# Patient Record
Sex: MALE | Race: WHITE | URBAN - METROPOLITAN AREA
[De-identification: names, ages, dates, MRNs, and addresses within clinical notes are randomized per-mention and may not be internally consistent; named-entity substitution may affect disease eponyms.]

---

## 2017-01-04 ENCOUNTER — OFFICE VISIT (OUTPATIENT)
Dept: FAMILY MEDICINE | Facility: CLINIC | Age: 32
End: 2017-01-04

## 2017-01-04 VITALS
WEIGHT: 149.4 LBS | OXYGEN SATURATION: 96 % | SYSTOLIC BLOOD PRESSURE: 134 MMHG | TEMPERATURE: 98.7 F | BODY MASS INDEX: 22.23 KG/M2 | DIASTOLIC BLOOD PRESSURE: 81 MMHG | HEART RATE: 88 BPM

## 2017-01-04 DIAGNOSIS — M25.571 ACUTE RIGHT ANKLE PAIN: Primary | ICD-10-CM

## 2017-01-04 NOTE — MR AVS SNAPSHOT
After Visit Summary   1/4/2017    Thomas Torres    MRN: [id number]           Patient Information     Date Of Birth          1985        Visit Information        Provider Department      1/4/2017 9:20 AM Jude Amos DO [facility]        Today's Diagnoses     Acute right ankle pain    -  1       Care Instructions    Ankle pain   - No fracture  - Ibuprofen as needed do not take more than 3200 mg in one day  - Elevate and Ice as able    Thank you for coming to [facility].  **If you had lab testing today and your results are reassuring or normal they will be be mailed to you within 7 days.   **If the lab tests need quick action we will call you with the results.  The phone number we will call with results is # [phone] (home) . If this is not the best number please call our clinic and change the number.  If you need any refills please call your pharmacy and they will contact us.  If you have any concerns about today's visit or wish to schedule another appointment please call our office during normal business hours [phone] (8-5:00 M-F)  If you have urgent medical concerns please call [phone] at any time of the day.  If you a medical emergency please call [phone]  Again thank you for choosing [facility] and please let us know how we can best partner with you to improve you and your family's health.            Follow-ups after your visit        Who to contact     Please call your clinic at [phone] to:    Ask questions about your health    Make or cancel appointments    Discuss your medicines    Learn about your test results    Speak to your doctor   If you have compliments or concerns about an experience at your clinic, or if you wish to file a complaint, please contact [facility] Patient Relations at [phone] or email us at [email or web address]         Additional Information About Your Visit        Care EveryWhere ID      This is your Care EveryWhere ID. This could be used by other organizations to access your [place] medical records  [id number]        Your Vitals Were     Pulse Temperature Pulse Oximetry             88 98.7  F (37.1  C) (Oral) 96%          Blood Pressure from Last 3 Encounters:   01/04/17 134/81   12/30/16 119/77   12/28/16 143/92    Weight from Last 3 Encounters:   01/04/17 149 lb 6.4 oz (67.767 kg)   12/30/16 149 lb 6.4 oz (67.767 kg)   10/31/16 148 lb 6 oz (67.302 kg)              We Performed the Following     XR Ankle Left G/E 3 Views     XR ANKLE RT G/E 3 VW        Primary Care Provider Office Phone # Fax #    Jude Amos [phone]       [address] [address]        Thank you!     Thank you for choosing [facility]  for your care. Our goal is always to provide you with excellent care. Hearing back from our patients is one way we can continue to improve our services. Please take a few minutes to complete the written survey that you may receive in the mail after your visit with us. Thank you!             Your Updated Medication List - Protect others around you: Learn how to safely use, store and throw away your medicines at www.disposemymeds.org.          This list is accurate as of: 1/4/17 10:53 AM.  Always use your most recent med list.                   Brand Name Dispense Instructions for use    ibuprofen 800 MG tablet    ADVIL/MOTRIN    90 tablet    Take 1 tablet (800 mg) by mouth every 8 hours as needed for moderate pain       order for DME     1 Units    Equipment being ordered: Gel boot for left leg

## 2017-01-04 NOTE — PROGRESS NOTES
Preceptor attestation:  Patient seen and discussed with the resident.  Assessment and plan reviewed with resident and agreed upon.  Supervising physician: Julian Whitaker  [facility]

## 2017-01-04 NOTE — PATIENT INSTRUCTIONS
Ankle pain   - No fracture  - Ibuprofen as needed do not take more than 3200 mg in one day  - Elevate and Ice as able    Thank you for coming to [facility].  **If you had lab testing today and your results are reassuring or normal they will be be mailed to you within 7 days.   **If the lab tests need quick action we will call you with the results.  The phone number we will call with results is # [phone] (home) . If this is not the best number please call our clinic and change the number.  If you need any refills please call your pharmacy and they will contact us.  If you have any concerns about today's visit or wish to schedule another appointment please call our office during normal business hours [phone] (8-5:00 M-F)  If you have urgent medical concerns please call [phone] at any time of the day.  If you a medical emergency please call [phone]  Again thank you for choosing [facility] and please let us know how we can best partner with you to improve you and your family's health.

## 2017-01-04 NOTE — PROGRESS NOTES
Subjective:  Thomas Torres is a 31 year old male who presents today with ankle pain. Stats that ankle continues to be swollen. The swelling has decreased since her was last seen. Continues to have swelling along the lateral aspect of this ankle. Does has some numbness along the lateral aspect of his ankle.     Current cares at home includes icing his ankle. Has been able to walk but states that it is still painful and dosent. completely trust his ankle yet. No numbness in his toes. Continues to wear his brace while ambulating. States that he internally rotated and inverted his ankle. Has been elevating his ankle and has been using ibuprofen as needed.      States that he did have a fever last night that resolved. Continue to have a cough but non-productive.     ROS: No sore throat. Non-productive cough.     Objective:  Vitals: /81 mmHg  Pulse 88  Temp(Src) 98.7  F (37.1  C) (Oral)  Wt 149 lb 6.4 oz (67.767 kg)  SpO2 96%  General: Well-nourished. Alert and cooperative. No apparent distress.  Cardiovascular: Regular rate and rhythm. Normal S1 and S2. No murmurs  Respiratory: Clear to auscultation bilaterally. No crackles or wheezes. Good air movement. No increased work of breathing.   Musculoskeletal: Swelling over the lateral malleolus of right ankle. Pain with palpation of the medial and lateral malleolus of right ankle. No pain with palpation of right calf. Pain with inversion of right foot along with eversion. Strength intake for dorsiflexion and plantarflexion. Sensation intact to soft touch in right foot.      Xray: No fracture but soft tissue swelling along lateral aspect of foot.      Assessment:  Thomas Torres is a 31 year old male seen today for right ankle pain.     Plan:    Acute right ankle pain: Repeat Xray was ngative for fracture. Will continue to treat with supportive cares including air-cast, ibuprofen, elevation, and ice. Declined formal PT. Discussed concerning signs and  symptoms of when to present back for further evalaution. Discussed about not taking more than 3200 mg of ibuprofen daily and not to take longer than 2 weeks continuously.   -     XR ANKLE RT G/E 3 VW    Patient was discussed with and seen by Dr. Whitaker.    Jude Amos PGY3

## 2017-01-04 NOTE — Clinical Note
January 5, 2017      Thomas Torres  [address] [address]        Dear Thomas,    Please see below for your test results.    Thomas the results of the xray as read by the radiologist was negative for fracture which is what we expected. If you have any questions please contact clinic at [phone]. Thank you for allowing us to participate in your care.     If you have any questions, please call the clinic to make an appointment.    Sincerely,    Jude Amos, DO

## 2017-01-05 NOTE — PROGRESS NOTES
Quick Note:    Please send letter to patient     Thomas the results of the xray as read by the radiologist was negative for fracture which is what we expected. If you have any questions please contact clinic at [phone]. Thank you for allowing us to participate in your care.     Sincerely,   Tito Amos  ______

## 2017-01-09 ENCOUNTER — OFFICE VISIT (OUTPATIENT)
Dept: FAMILY MEDICINE | Facility: CLINIC | Age: 32
End: 2017-01-09

## 2017-01-09 VITALS
HEART RATE: 104 BPM | TEMPERATURE: 98.1 F | DIASTOLIC BLOOD PRESSURE: 96 MMHG | SYSTOLIC BLOOD PRESSURE: 148 MMHG | RESPIRATION RATE: 22 BRPM | OXYGEN SATURATION: 97 %

## 2017-01-09 DIAGNOSIS — R05.9 COUGH: ICD-10-CM

## 2017-01-09 DIAGNOSIS — J18.9 PNEUMONIA OF BOTH LUNGS DUE TO INFECTIOUS ORGANISM, UNSPECIFIED PART OF LUNG: ICD-10-CM

## 2017-01-09 DIAGNOSIS — R06.02 SOB (SHORTNESS OF BREATH): Primary | ICD-10-CM

## 2017-01-09 RX ORDER — BENZONATATE 100 MG/1
100 CAPSULE ORAL 3 TIMES DAILY PRN
Qty: 60 CAPSULE | Refills: 1 | Status: SHIPPED | OUTPATIENT
Start: 2017-01-09 | End: 2017-12-06

## 2017-01-09 RX ORDER — ALBUTEROL SULFATE 90 UG/1
2 AEROSOL, METERED RESPIRATORY (INHALATION) EVERY 6 HOURS PRN
Qty: 1 INHALER | Refills: 1 | Status: SHIPPED | OUTPATIENT
Start: 2017-01-09

## 2017-01-09 RX ORDER — AZITHROMYCIN 250 MG/1
TABLET, FILM COATED ORAL
Qty: 6 TABLET | Refills: 0 | Status: SHIPPED | OUTPATIENT
Start: 2017-01-09 | End: 2017-12-06

## 2017-01-09 RX ORDER — DEXTROMETHORPHAN HBR AND GUAIFENESIN 5; 100 MG/5ML; MG/5ML
10 LIQUID ORAL EVERY 6 HOURS PRN
Qty: 237 ML | Refills: 1 | Status: SHIPPED | OUTPATIENT
Start: 2017-01-09 | End: 2017-12-06

## 2017-01-09 NOTE — PROGRESS NOTES
[address] [address]  [phone]         SUBJECTIVE       Thomas Torres is a 31 year old  male with no signficant PMH significant for:    He presents with concerns of shortness of breath.    About one week ago, patient began having productive cough that occurred intermittently throughout the day with no provocative or palliative factors. He denies any sick contacts, prolonged immobilization, or r ecent travel. He also noted some nasal congestion, but denies any fevers, chills, nausea, vomiting, myalgias, chest pain, hemoptysis, weight loss, headaches, focal weakness, calf tenderness, leg swelling, or paresthesias. He has tried ibuprofen, vicks vapor rub, and OTC cough syrup with minimal relief of his symptoms. He also took 1/2 tablet of an old oxycodone tablet he had from a prior surgery due to the muscle discomfort he had in his chest from the coughing. Last night, his cough acutely worsened and was ocuring much more frequently making it hard for him to sleep. He is worried because he is taking shallow breaths and feeling short of breath due to the coughing. He does drink caffeine frequently, but denies any increase in usage. Smokes 1/2 PPD to 1 PPD but has not smoked the last week due to his cough. Denies any other illicit drug use. Has no history of blood clots. No family or personal history of heart disease. Denies any history of asthma or anxiety.    Past Medical History:  History reviewed. No pertinent past medical history.    Past Surgical History:  Past Surgical History   Procedure Laterality Date     Knee surgery Left [id number]     left knee ACL and FCL reconstruction       Family History:  Family History   Problem Relation Age of Onset     DIABETES No family hx of      CANCER No family hx of      HEART DISEASE No family hx of        Social History:  Social History     Social History     Marital Status: Single     Spouse Name: N/A     Number of Children: N/A     Years  of Education: N/A     Occupational History     Not on file.     Social History Main Topics     Smoking status: Current Every Day Smoker -- 1.00 packs/day     Types: Cigarettes     Smokeless tobacco: Not on file     Alcohol Use: Yes      Comment: twice a year     Drug Use: No     Sexual Activity: Not Currently     Other Topics Concern     Not on file     Social History Narrative       Medications:   Current Outpatient Prescriptions   Medication Sig Dispense Refill     ibuprofen (ADVIL/MOTRIN) 800 MG tablet Take 1 tablet (800 mg) by mouth every 8 hours as needed for moderate pain 90 tablet 1     order for DME Equipment being ordered: Gel boot for left leg 1 Units 0       Allergies:     Allergies   Allergen Reactions     Lactose      Sulfa Drugs        PMH, Surgical Hx, Family Hx, Social Hx, Medications and Allergies were reviewed and updated as needed.          OBJECTIVE     Filed Vitals:    01/09/17 0819 01/09/17 0824 01/09/17 0857   BP: 148/96 148/96    Pulse: 115 107 104   Temp: 98.1  F (36.7  C)     TempSrc: Oral     Resp: 34  22   SpO2: 98% 97%      There is no weight on file to calculate BMI.    Exam before nebulizer treatment:   Constitutional: Initially on exam was anxious appearing, hyperventilating, mildly uncomfortable, thin pleasant White male  HEENT: Sclera white, no eye discharge, nares clear without discharge, moist mucous membranes without lesions, normal external ears, no drooling  Pulm: Shallow quick breaths on room air, equal airflow, no rhonchi or wheezes  CV: tachycardic, regular rhythm, radial pulses 2+ bilaterally  Extremities: Legs equal without edema or tenderness  Psych: Anxious affect    Exam after nebulizer treatment:   Constitutional: Comfortably seated in chair, calm thin pleasant White male  HEENT: Sclera white, no eye discharge, nares clear without discharge, moist mucous membranes without lesions, normal external ears, no drooling  Pulm: unlabored respirations on room air, no wheezes,  occasional dry cough, equal airflow  CV: regular rate and rhythm, radial pulses 2+ bilaterally  Extremities: Legs equal without edema or tenderness  Psych: Normal affect and mood    1/9/17 CXR: Per my preliminary read, patchy infiltrate bilaterally in the middle lobes      ASSESSMENT AND PLAN     Thomas Torres is a 31 year old  male with no signficant past medical history who presents for shortness of breath likely duet to pneumonia.     SOB (shortness of breath), Cough, and Pneumonia of both lungs due to infectious organism, unspecified part of lung: Initially tachycardic and tachypneic on exam with shallow breaths. Pulse ox was 97%nd vitals improved after receiving albuterol nebulizer in clinic. Preliminary read of CXR wiith possible bilateral infiltrate, so will treat with Z-pack as outpatient since patient can tolerate oral intake. Will also symptomatically managed with cough syrup, tessalon pearls, pain control, and albuterol inhaler since it helped the patient's symptoms. Suspect there may also be an anxiety component that exacerbated symptoms. Low suspicion for PE given patient's symptoms improved after nebulizer and there were no other associated symptoms or risk factors. Will have patient closely follow up in clinic to follow up.  -     Albuterol Unit Dose in Neb in clinic  -     XR CHEST 2 VW: Preliminary view per my read shows bilateral infiltrate. Will send letter to patient with final read.   -     albuterol (PROAIR HFA/PROVENTIL HFA/VENTOLIN HFA) 108 (90 BASE) MCG/ACT Inhaler; Inhale 2 puffs into the lungs every 6 hours as needed for shortness of breath / dyspnea or wheezing  -     Dextromethorphan-Guaifenesin (DELSYM COUGH/CHEST CONGEST DM) 5-100 MG/5ML LIQD; Take 10 mg by mouth every 6 hours as needed  -     benzonatate (TESSALON) 100 MG capsule; Take 1 capsule (100 mg) by mouth 3 times daily as needed for cough  -     azithromycin (ZITHROMAX) 250 MG tablet; Take 2 tablets on day 1, and then 1  tablet day 2 to 5    RTC in 1 week for follow up of pneumonia and breathing or sooner if develops new or worsening symptoms.    Options for treatment and/or follow-up care were reviewed with the patient who was engaged and actively involved in the decision making process and verbalized understanding of the options discussed and was satisfied with the final plan.    Yusra Rae MD PGY-2  [facility]  Pager: [phone]    Patient discussed with Dr. Swati Chicas, attending physician, who agrees with the plan.

## 2017-01-09 NOTE — MR AVS SNAPSHOT
After Visit Summary   1/9/2017    Thomas Torres    MRN: [id number]           Patient Information     Date Of Birth          1985        Visit Information        Provider Department      1/9/2017 8:20 AM Yusra Rae MD [facility]        Today's Diagnoses     SOB (shortness of breath)    -  1     Cough         Pneumonia of both lungs due to infectious organism, unspecified part of lung           Care Instructions      Thanks for coming to [facility] today!    -If your symptoms worsen and you have continued difficulty breathing, go to the Emergency Room  -We will send you a letter with the final results of your x-ray  -Take azithromycin (z-pack) antibiotics  -Stay well hydrated.   -You can use Tylenol or ibuprofen for comfort.   -Use delsym cough syrup and tesalon pearls as needed for cough  -Use albuterol inhaler as needed for wheezing    Please return to clinic in 1 weeks or sooner as needed if you have new concerns or symptoms worsen.  Shortness of Breath (Dyspnea)  Shortness of breath is the feeling that you can't catch your breath or get enough air. It is also known as dyspnea.  Dyspnea can be caused by many different conditions. They include:    Acute asthma attack.    Worsening of chronic lung diseases such as chronic bronchitis and emphysema.     Heart failure. This is when weak heart muscle allows extra fluid to collect in the lungs.    Panic attacks or anxiety. Fear can cause rapid breathing (hyperventilation).    Pneumonia, or an infection in the lung tissue.    Exposure to toxic substances, fumes, smoke, or certain medicines.    Blood clot in the lung (pulmonary embolism). This is often from a piece of blood clot in a deep vein of the leg (deep vein thrombosis) that breaks off and travels to the lungs.     Heart attack or heart-related chest pain (angina).    Anemia.    Collapsed lung (pneumothorax).    Dehydration.    Pregnancy.  Based on your visit today, the  exact cause of your shortness of breath is not certain. Your tests don t show any of the serious causes of dyspnea. You may need other tests to find out if you have a serious problem. It s important to watch for any new symptoms or symptoms that get worse. Follow up with your healthcare provider as directed.  Home care  Follow these tips to take care of yourself at home:    When your symptoms are better, go back to your usual activities.    If you smoke, you should stop. Join a quit-smoking program or ask your healthcare provider for help.    Eat a healthy diet and get plenty of sleep.    Get regular exercise. Talk with your healthcare provider before starting to exercise, especially if you have other medical problems.    Cut down on the amount of caffeine and stimulants you consume.  Follow-up care  Follow up with your healthcare provider, or as advised.  If tests were done, you will be told if your treatment needs to be changed. You can call as directed for the results.  (Note: If an X-ray was taken, a specialist will review it. You will be notified of any new findings that may affect your care.)  Call 911 or get immediate medical care  Shortness of breath may be a sign of a serious medical problem. For example, it may be a problem with your heart or lungs. Call 911 if you have worsening shortness of breath or trouble breathing, especially with any of the symptoms below:    You are confused or it s difficult to wake you.    You faint or lose consciousness.    You have a fast heartbeat, or your heartbeat is irregular.     You are coughing up blood.    You have pain in your chest, arm, shoulder, neck, or upper back.    You break out in a sweat.  When to seek medical advice  Call your healthcare provider right away if any of these occur:    Slight shortness of breath or wheezing     Redness, pain or swelling in your leg, arm, or other body area    Swelling in both legs or ankles    Fast weight  gain    Dizziness or weakness    Fever of 100.4 F (38 C) or higher, or as directed by your healthcare provider    1682-6606 [employer]. [address] [place] [address], [place] [address]. All rights reserved. This information is not intended as a substitute for professional medical care. Always follow your healthcare professional's instructions.              Follow-ups after your visit        Follow-up notes from your care team     Return in about 1 week (around 1/16/2017), or if symptoms worsen or fail to improve, for Cough Follow Up.      Who to contact     Please call your clinic at [phone] to:    Ask questions about your health    Make or cancel appointments    Discuss your medicines    Learn about your test results    Speak to your doctor   If you have compliments or concerns about an experience at your clinic, or if you wish to file a complaint, please contact [facility] Patient Relations at [phone] or email us at [email or web address]         Additional Information About Your Visit        Care EveryWhere ID     This is your Care EveryWhere ID. This could be used by other organizations to access your [place] medical records  [id number]        Your Vitals Were     Pulse Temperature Respirations Pulse Oximetry          104 98.1  F (36.7  C) (Oral) 22 97%         Blood Pressure from Last 3 Encounters:   01/09/17 148/96   01/04/17 134/81   12/30/16 119/77    Weight from Last 3 Encounters:   01/04/17 149 lb 6.4 oz (67.767 kg)   12/30/16 149 lb 6.4 oz (67.767 kg)   10/31/16 148 lb 6 oz (67.302 kg)              We Performed the Following     Albuterol Unit Dose in [facility] in clinic     XR CHEST 2 VW          Today's Medication Changes          These changes are accurate as of: 1/9/17  9:53 AM.  If you have any questions, ask your nurse or doctor.               Start taking these medicines.        Dose/Directions    albuterol 108 (90 BASE) MCG/ACT Inhaler   Commonly  known as:  PROAIR HFA/PROVENTIL HFA/VENTOLIN HFA   Used for:  SOB (shortness of breath)   Started by:  Yusra Rae MD        Dose:  2 puff   Inhale 2 puffs into the lungs every 6 hours as needed for shortness of breath / dyspnea or wheezing   Quantity:  1 Inhaler   Refills:  1       azithromycin 250 MG tablet   Commonly known as:  ZITHROMAX   Used for:  Pneumonia of both lungs due to infectious organism, unspecified part of lung   Started by:  Yusra Rae MD        Take 2 tablets on day 1, and then 1 tablet day 2 to 5   Quantity:  6 tablet   Refills:  0       benzonatate 100 MG capsule   Commonly known as:  TESSALON   Used for:  Cough   Started by:  Yusra Rae MD        Dose:  100 mg   Take 1 capsule (100 mg) by mouth 3 times daily as needed for cough   Quantity:  60 capsule   Refills:  1       Dextromethorphan-Guaifenesin 5-100 MG/5ML Liqd   Commonly known as:  DELSYM COUGH/CHEST CONGEST DM   Used for:  Cough   Started by:  Yusra Rae MD        Dose:  10 mg   Take 10 mg by mouth every 6 hours as needed   Quantity:  237 mL   Refills:  1            Where to get your medicines      [facility] [place] [place] - [address], [place] [address]     Phone:  [phone]    - albuterol 108 (90 BASE) MCG/ACT Inhaler  - azithromycin 250 MG tablet  - benzonatate 100 MG capsule  - Dextromethorphan-Guaifenesin 5-100 MG/5ML Liqd             Primary Care Provider Office Phone # Fax #    Jude Amos [phone]       [address] [address]        Thank you!     Thank you for choosing [facility]  for your care. Our goal is always to provide you with excellent care. Hearing back from our patients is one way we can continue to improve our services. Please take a few minutes to complete the written survey that you may receive in the mail after your visit with  us. Thank you!             Your Updated Medication List - Protect others around you: Learn how to safely use, store and throw away your medicines at www.disposemymeds.org.          This list is accurate as of: 1/9/17  9:53 AM.  Always use your most recent med list.                   Brand Name Dispense Instructions for use    albuterol 108 (90 BASE) MCG/ACT Inhaler    PROAIR HFA/PROVENTIL HFA/VENTOLIN HFA    1 Inhaler    Inhale 2 puffs into the lungs every 6 hours as needed for shortness of breath / dyspnea or wheezing       azithromycin 250 MG tablet    ZITHROMAX    6 tablet    Take 2 tablets on day 1, and then 1 tablet day 2 to 5       benzonatate 100 MG capsule    TESSALON    60 capsule    Take 1 capsule (100 mg) by mouth 3 times daily as needed for cough       Dextromethorphan-Guaifenesin 5-100 MG/5ML Liqd    DELSYM COUGH/CHEST CONGEST DM    237 mL    Take 10 mg by mouth every 6 hours as needed       ibuprofen 800 MG tablet    ADVIL/MOTRIN    90 tablet    Take 1 tablet (800 mg) by mouth every 8 hours as needed for moderate pain       order for DME     1 Units    Equipment being ordered: Gel boot for left leg

## 2017-01-09 NOTE — PATIENT INSTRUCTIONS
Thanks for coming to [facility] today!    -If your symptoms worsen and you have continued difficulty breathing, go to the Emergency Room  -We will send you a letter with the final results of your x-ray  -Take azithromycin (z-pack) antibiotics  -Stay well hydrated.   -You can use Tylenol or ibuprofen for comfort.   -Use delsym cough syrup and tesalon pearls as needed for cough  -Use albuterol inhaler as needed for wheezing    Please return to clinic in 1 weeks or sooner as needed if you have new concerns or symptoms worsen.  Shortness of Breath (Dyspnea)  Shortness of breath is the feeling that you can't catch your breath or get enough air. It is also known as dyspnea.  Dyspnea can be caused by many different conditions. They include:    Acute asthma attack.    Worsening of chronic lung diseases such as chronic bronchitis and emphysema.     Heart failure. This is when weak heart muscle allows extra fluid to collect in the lungs.    Panic attacks or anxiety. Fear can cause rapid breathing (hyperventilation).    Pneumonia, or an infection in the lung tissue.    Exposure to toxic substances, fumes, smoke, or certain medicines.    Blood clot in the lung (pulmonary embolism). This is often from a piece of blood clot in a deep vein of the leg (deep vein thrombosis) that breaks off and travels to the lungs.     Heart attack or heart-related chest pain (angina).    Anemia.    Collapsed lung (pneumothorax).    Dehydration.    Pregnancy.  Based on your visit today, the exact cause of your shortness of breath is not certain. Your tests don t show any of the serious causes of dyspnea. You may need other tests to find out if you have a serious problem. It s important to watch for any new symptoms or symptoms that get worse. Follow up with your healthcare provider as directed.  Home care  Follow these tips to take care of yourself at home:    When your symptoms are better, go back to your usual activities.    If you smoke,  you should stop. Join a quit-smoking program or ask your healthcare provider for help.    Eat a healthy diet and get plenty of sleep.    Get regular exercise. Talk with your healthcare provider before starting to exercise, especially if you have other medical problems.    Cut down on the amount of caffeine and stimulants you consume.  Follow-up care  Follow up with your healthcare provider, or as advised.  If tests were done, you will be told if your treatment needs to be changed. You can call as directed for the results.  (Note: If an X-ray was taken, a specialist will review it. You will be notified of any new findings that may affect your care.)  Call 911 or get immediate medical care  Shortness of breath may be a sign of a serious medical problem. For example, it may be a problem with your heart or lungs. Call 911 if you have worsening shortness of breath or trouble breathing, especially with any of the symptoms below:    You are confused or it s difficult to wake you.    You faint or lose consciousness.    You have a fast heartbeat, or your heartbeat is irregular.     You are coughing up blood.    You have pain in your chest, arm, shoulder, neck, or upper back.    You break out in a sweat.  When to seek medical advice  Call your healthcare provider right away if any of these occur:    Slight shortness of breath or wheezing     Redness, pain or swelling in your leg, arm, or other body area    Swelling in both legs or ankles    Fast weight gain    Dizziness or weakness    Fever of 100.4 F (38 C) or higher, or as directed by your healthcare provider    1689-9039 The [employer]. [address] [place] [address], [place] [address]. All rights reserved. This information is not intended as a substitute for professional medical care. Always follow your healthcare professional's instructions.

## 2017-01-11 PROBLEM — R91.8 OPACITIES OF BOTH LUNGS PRESENT ON CHEST X-RAY: Status: ACTIVE | Noted: 2017-01-11

## 2017-01-11 NOTE — PROGRESS NOTES
"Quick Note:    CXR Results:    \"Patchy airspace opacities in the mid right lung and mid and lower left lung are most likely pneumonia. If the patient has symptoms of pneumonia, recommend treatment and a follow up chest radiograph in 6-8 weeks to document clearance. If the clinical picture is not consistent with pneumonia, consider noncontrast chest CT in further evaluation, as noninfectious entities such as sarcoidosis occasionally have this appearance radiographically.\"    Discussed results with patient over the phone. Patient states he feels better since taking z-pack and tessalon deisy and inhaler. He has even stopped smoking the last few days. He expresses understanding to come back to clinic in 6-8 weeks for repeat CXR.  ______  "

## 2017-12-06 ENCOUNTER — OFFICE VISIT (OUTPATIENT)
Dept: FAMILY MEDICINE | Facility: CLINIC | Age: 32
End: 2017-12-06

## 2017-12-06 VITALS — DIASTOLIC BLOOD PRESSURE: 73 MMHG | TEMPERATURE: 98.4 F | SYSTOLIC BLOOD PRESSURE: 134 MMHG | HEART RATE: 81 BPM

## 2017-12-06 DIAGNOSIS — L30.1 DYSHIDROTIC ECZEMA: ICD-10-CM

## 2017-12-06 DIAGNOSIS — Z20.7 SCABIES EXPOSURE: Primary | ICD-10-CM

## 2017-12-06 DIAGNOSIS — R21 RASH: ICD-10-CM

## 2017-12-06 RX ORDER — PERMETHRIN 50 MG/G
CREAM TOPICAL
Qty: 60 G | Refills: 1 | Status: SHIPPED | OUTPATIENT
Start: 2017-12-06

## 2017-12-06 RX ORDER — TRIAMCINOLONE ACETONIDE 5 MG/G
CREAM TOPICAL
Qty: 30 G | Refills: 0 | Status: SHIPPED | OUTPATIENT
Start: 2017-12-06

## 2017-12-06 NOTE — MR AVS SNAPSHOT
After Visit Summary   12/6/2017    Thomas Torres    MRN: [id number]           Patient Information     Date Of Birth          1985        Visit Information        Provider Department      12/6/2017 11:20 AM Arnoldo Jeffries DO [facility]        Today's Diagnoses     Scabies exposure    -  1    Rash        Dyshidrotic eczema          Care Instructions      Atopic Dermatitis (Adult)  Atopic dermatitis is a dry, itchy, red rash. It s also called eczema. The rash is chronic, or ongoing. It can come and go over time. The disease is often passed down in families. It causes a problem with the skin barrier that makes the skin more sensitive to the environment and other factors. The increased skin sensitivity causes an itch, which causes scratching. Scratching can worsen the itching or also break the skin. This can put the skin at risk of infection.  The condition is most common in people with asthma, hay fever, hives, or dry or sensitive skin. The rash may be caused by extreme heat or heavy sweating. Skin irritants can cause the rash to flare up. These can include wool or silk clothing, grease, oils, some medicines, and harsh soaps and detergents. Emotional stress can also be a trigger.  Treatment is done to relieve the itching and inflammation of the skin.  Home care  Follow these tips to care for your condition:    Keep the areas of rash clean by bathing at least every other day. Use lukewarm water to bathe. Don t use hot water, which can dry out the skin.    Don t use soaps with strong detergents. Use mild soaps made for sensitive skin.    Apply a cream or ointment to damp skin right after bathing.    Avoid things that irritate your skin. Wear absorbent, soft fabrics next to the skin rather than rough or scratchy materials.    Use mild laundry soap free of scents and perfumes. Make sure to rinse all the soap out of your clothes.    Treat any skin infection as directed.    Use oral  diphenhydramine to help reduce itching. This is an antihistamine you can buy at drug and grocery stores. It can make you sleepy, so use lower doses during the daytime. Or you can use loratadine. This is an antihistamine that will not make you sleepy. Do not use diphenhydramine if you have glaucoma or have trouble urinating due to an enlarged prostate.  Follow-up care  See your healthcare provider, or as advised. If your symptoms don t get better or if they get worse in the next 7 days, make an appointment with your healthcare provider.  When to seek medical advice  Call your healthcare provider right away  if any of these occur:    Increasing area of redness or pain in the skin    Yellow crusts or wet drainage from the rash    Fever of 100.4 F (38 C) or higher, or as directed by your healthcare provider  Date Last Reviewed: 9/1/2016 2000-2017 The [employer]. [address] [place] [address]. All rights reserved. This information is not intended as a substitute for professional medical care. Always follow your healthcare professional's instructions.        Scabies  Scabies is a skin infection. It is caused by a tiny parasitic insect, or mite, that is too small to see directly. It can be seen under a microscope, but it is usually recognized only by the rash and symptoms it causes. This can make it hard to diagnose since the signs and symptoms can be similar to other diseases.  The scabies mite tunnels under the skin. It creates a small burrow, where it leaves its eggs. These eggs cabrera and grow into adults. They then create new burrows over the next 1 to 2 weeks. The mites die in about 4 to 6 weeks. The rash and itching are caused by an allergic reaction to the scabies saliva or feces.  Scabies is highly contagious. It is spread by direct skin contact. It is easily spread by close personal contact, sexual contact, or by sharing bed linens or clothing used by an infected person.  It may take 4 to  6 weeks for symptoms to appear after being exposed. Everyone living in the house with you, as well as your sexual partners, should be treated at the same time. After the first treatment, you will no longer be contagious. You may return to work, school or .  Home care    Machine wash in hot water all sheets, towels, pillowcases, underwear, pajamas, and any other clothing you have worn lately. Use the hot cycle of a dryer or use a hot iron to sterilize.    Seal anything that is hard to wash in a plastic trash bag for 4 days. This includes coats, jackets, blankets, and bedspreads. (The insects die after 3 days off the human body.)  Medicines  Scabicides  Medicines used to treat scabies are called scabicides. These are creams that kill the scabies mites. A prescription is needed. When using these medicines:    Always follow instructions provided by your healthcare provider and pharmacist. Also follow the printed instructions that come with the medicine.    Talk with your provider about precautions to take when using these medicines.    Use the cream on your body when your skin is cool and dry. Don t use it after a hot shower or bath.    Usually the cream is put on your whole body. This means from your chin all the way down to your toes. Scabies does not usually affect an adult s head. So cream is not needed there. For children, discuss this with your child s provider.    Leave the cream or lotion on for the recommended amount of time. This is usually 8 to 12 hours.    Don t leave cream or lotion on your skin longer than directed. Don t use more than recommended.    Clean clothes should be worn after the treatment.    If you wash your hands after using the cream, you will need to reapply the cream to your hands.    If you are breastfeeding, wash off your nipples before feeding. Then reapply the cream after breastfeeding.    For babies or infants, put mittens on their hands. This will stop them from licking the  cream or lotion. It will also stop them from scratching themselves because of the itching.  Other medicines    An oral medicine called ivermectin may be prescribed for severe cases. It may also be used if you can t apply creams.    Itching may cause the most discomfort. If large areas of your skin are affected, over-the-counter antihistamines may be used to reduce itching. Or you may be given a prescription antihistamine. Some of these medicines may make you sleepy. They are best used at bedtime. Antihistamines that don t make you sleepy can be used during the day. Note: Don t use medicine that has diphenhydramine if you have glaucoma, or if you are a man who has trouble urinating due to an enlarged prostate.    If you were given antibiotics due to a bacterial infection, take them until they are finished. It is important to finish the antibiotics even if the wound looks better. This is to make sure the infection has cleared.  Follow-up care  Follow up with your healthcare provider, or as advised. Call your provider if your symptoms don t improve after 1 week, or if new burrows or rashes appear.  When to seek medical advice  Call your healthcare provider right away if any of these occur:    Yellow-brown crusts or drainage from the sores    Other signs of infection, including increasing redness, swelling, pain, or pus    Fever of 100.4 F (38 C) or higher, or as directed by your provider  Date Last Reviewed: 8/1/2016 2000-2017 [employer]. [address] [place] [address]. All rights reserved. This information is not intended as a substitute for professional medical care. Always follow your healthcare professional's instructions.                Follow-ups after your visit        Who to contact     Please call your clinic at [phone] to:    Ask questions about your health    Make or cancel appointments    Discuss your medicines    Learn about your test results    Speak to your doctor   If  you have compliments or concerns about an experience at your clinic, or if you wish to file a complaint, please contact [facility] Patient Relations at [phone] or email us at [email or web address]         Additional Information About Your Visit        [employer] Information     [email or web address] is an electronic gateway that provides easy, online access to your medical records. With [employer], you can request a clinic appointment, read your test results, renew a prescription or communicate with your care team.     To sign up for [employer] visit the website at [email or web address]   You will be asked to enter the access code listed below, as well as some personal information. Please follow the directions to create your username and password.     Your access code is: [email or web address]  Expires: 3/6/2018 12:09 PM     Your access code will  in 90 days. If you need help or a new code, please contact your [facility] Clinic or call [phone] for assistance.        Care EveryWhere ID     This is your Care EveryWhere ID. This could be used by other organizations to access your [facility] medical records  [id number]        Your Vitals Were     Pulse Temperature                81 98.4  F (36.9  C) (Oral)           Blood Pressure from Last 3 Encounters:   17 134/73   17 (!) 148/96   17 134/81    Weight from Last 3 Encounters:   17 149 lb 6.4 oz (67.8 kg)   16 149 lb 6.4 oz (67.8 kg)   10/31/16 148 lb 6 oz (67.3 kg)              Today, you had the following     No orders found for display         Today's Medication Changes          These changes are accurate as of: 17 12:09 PM.  If you have any questions, ask your nurse or doctor.               Start taking these medicines.        Dose/Directions    permethrin 5 % cream   Commonly known as:  ELIMITE   Used for:  Scabies exposure   Started by:  Arnoldo Jeffries, DO        Apply cream  from head to toe (except the face); leave on for 8-14 hours then wash off with water; reapply in 1 week if live mites appear.   Quantity:  60 g   Refills:  1       triamcinolone 0.5 % cream   Commonly known as:  KENALOG   Used for:  Dyshidrotic eczema   Started by:  Arnoldo Jeffries,         Apply sparingly to affected area three times daily.   Quantity:  30 g   Refills:  0         Stop taking these medicines if you haven't already. Please contact your care team if you have questions.     azithromycin 250 MG tablet   Commonly known as:  ZITHROMAX   Stopped by:  Arnoldo Jeffries,            benzonatate 100 MG capsule   Commonly known as:  TESSALON   Stopped by:  Arnoldo Jeffries DO           Dextromethorphan-Guaifenesin 5-100 MG/5ML Liqd   Commonly known as:  DELSYM COUGH/CHEST CONGEST DM   Stopped by:  Arnoldo Jeffries DO           ibuprofen 800 MG tablet   Commonly known as:  ADVIL/MOTRIN   Stopped by:  Arnoldo eJffries DO                Where to get your medicines      These medications were sent to [facility] - [place], [place] - [address], [place] [address]     Phone:  [phone]     permethrin 5 % cream    triamcinolone 0.5 % cream                Primary Care Provider Office Phone # Fax #    Fernando [place]on [phone]       [facility] [address] [place] [address]        Equal Access to Services     [facility] HospitalTITUS : Hadii aad ku hadasho Soomaali, waaxda luqadaha, qaybta kaalmada adeegyada, estrella childers . So [facility] [phone].    ATENCIÓN: Si habla español, tiene a avina disposición servicios gratuitos de asistencia lingüística. Sam lindsey [phone].    We comply with applicable federal civil rights laws and [place] laws. We do not discriminate on the basis of race, color, national origin, age, disability, sex, sexual orientation, or gender identity.            Thank you!     Thank you  for choosing [facility]  for your care. Our goal is always to provide you with excellent care. Hearing back from our patients is one way we can continue to improve our services. Please take a few minutes to complete the written survey that you may receive in the mail after your visit with us. Thank you!             Your Updated Medication List - Protect others around you: Learn how to safely use, store and throw away your medicines at www.disposemymeds.org.          This list is accurate as of: 12/6/17 12:09 PM.  Always use your most recent med list.                   Brand Name Dispense Instructions for use Diagnosis    albuterol 108 (90 BASE) MCG/ACT Inhaler    PROAIR HFA/PROVENTIL HFA/VENTOLIN HFA    1 Inhaler    Inhale 2 puffs into the lungs every 6 hours as needed for shortness of breath / dyspnea or wheezing    SOB (shortness of breath)       order for DME     1 Units    Equipment being ordered: Gel boot for left leg    Sprain of tibiofibular ligament of right ankle, initial encounter       permethrin 5 % cream    ELIMITE    60 g    Apply cream from head to toe (except the face); leave on for 8-14 hours then wash off with water; reapply in 1 week if live mites appear.    Scabies exposure       triamcinolone 0.5 % cream    KENALOG    30 g    Apply sparingly to affected area three times daily.    Dyshidrotic eczema

## 2017-12-06 NOTE — PROGRESS NOTES
Preceptor attestation:  Patient seen and discussed with the resident. Assessment and plan reviewed with resident and agreed upon.  Supervising physician: Kay Snyder  [facility]

## 2017-12-06 NOTE — PROGRESS NOTES
SUBJECTIVE   Thomas Torres is a 32 year old  male with a PMH significant for:   Patient Active Problem List   Diagnosis     Opacities of both lungs present on chest x-ray   Who presents to clinic for evaluation of 2 different skin complaints.    1.  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  His coworker notified him yesterday that he had scabies.  Patient has been experiencing a pruritic rash on his back for the past few days.  He notes that he does use a heating pad on his left shoulder.  He denies any lesions in the webs of his digits or wrist or elbows.  No fevers, draining lesions, or visible mites.    2.  Eczema: Patient reports history of pruritic and dry scaly skin on his feet bilaterally for years.  He has been using over-the-counter hydrocortisone with some minimal relief.  He reports using the Lamisil he was prescribed about a year ago which did not make any change.  He denies any family history of psoriasis but notes that his mother has a similar skin condition.    PMH, Medications and Allergies were reviewed and updated as needed.      REVIEW OF SYSTEMS   General: No fevers, chills, recent weight changes  HEENT: No headache, vision changes, otalgia, sore throat  Skin: Per HPI  MSK: No myalgias, arthralgias  Neuro: No headaches, weakness      Family and Social Hx     PMH:   Past Medical History:   Diagnosis Date     Opacities of both lungs present on chest x-ray 1/11/2017         PSH:   Past Surgical History:   Procedure Laterality Date     KNEE SURGERY Left [id number]    left knee ACL and FCL reconstruction     SH:   Social History     Social History     Marital status: Single     Spouse name: N/A     Number of children: N/A     Years of education: N/A     Occupational History     Not on file.     Social History Main Topics     Smoking status: Current Every Day Smoker     Packs/day: 1.00     Types: Cigarettes     Smokeless tobacco: Not on file     Alcohol use Yes      Comment:  twice a year     Drug use: No     Sexual activity: Not Currently     Other Topics Concern     Not on file     Social History Narrative     FH: non-contributory       Family History   Problem Relation Age of Onset     DIABETES No family hx of      CANCER No family hx of      HEART DISEASE No family hx of          OBJECTIVE     Vitals:    12/06/17 1128   BP: 134/73   BP Location: Left arm   Patient Position: Sitting   Cuff Size: Adult Large   Pulse: 81   Temp: 98.4  F (36.9  C)   TempSrc: Oral     There is no height or weight on file to calculate BMI.  Gen:  Well nourished and in NAD  HEENT: PERRL. EOMI   CV:  Good distal perfusion.  Pulm:  Normal work of breathing  Extrem: No cyanosis, edema or clubbing.   MSK: gross motor and sensation intact, gait normal, tone normal  Skin: Diffuse clusters of erythematous papules on the back including both thoracic and lumbar regions.  No obvious tracts or burrows.  No lesions in the inter-webspaces or wrists.  The feet bilaterally have multiple erythematous and scaly lesions.  Not particularly thick.  No onychomycosis noted.   Psych: Alert and oriented. Calm, cooperative, pleasant with good eye contact. No abnormalities of speech or psychomotor behavior. Mood is euthymic with congruent affect and full range.      ASSESSMENT AND PLAN     This  32 year old  male presents with multiple rashes    1. Scabies exposure  2. Rash  Patient's exposure to scabies and pruritic rash will treat empirically with permethrin.  Location of rash is not classic and other causes could include heat rash considering his use of a heating pad on that area.  - permethrin (ELIMITE) 5 % cream; Apply cream from head to toe (except the face); leave on for 8-14 hours then wash off with water; reapply in 1 week if live mites appear.  Dispense: 60 g; Refill: 1    3. Dyshidrotic eczema  Differential includes dyshidrotic eczema and tinea pedis.  Will prescribe triamcinolone for his feet.  He will return after his  trip to [place] in 10 days if symptoms fail to improve.  - triamcinolone (KENALOG) 0.5 % cream; Apply sparingly to affected area three times daily.  Dispense: 30 g; Refill: 0    I ended our visit today by discussing the patient's diagnoses and recommended treatment. Please refer to today's diagnoses and orders for further details. I briefly discussed the pathophysiology of these conditions and outlined their expected course. I discussed the warning symptoms and signs that indicate an atypical course that would need urgent or emergent care. I also discussed self care strategies for symptom relief. Patient voiced understanding of plan of care and was in full agreement to proceed as discussed.    RTC in 3-4 weeks (after return from [place]) for follow up or sooner if develops new or worsening symptoms.  Patient discussed and seen with Darlin Snyder MD, attending physician who agrees with the plan.     Arnoldo Jeffries DO PGY-3  [facility]   Pager: [phone]

## 2017-12-06 NOTE — PATIENT INSTRUCTIONS
Atopic Dermatitis (Adult)  Atopic dermatitis is a dry, itchy, red rash. It s also called eczema. The rash is chronic, or ongoing. It can come and go over time. The disease is often passed down in families. It causes a problem with the skin barrier that makes the skin more sensitive to the environment and other factors. The increased skin sensitivity causes an itch, which causes scratching. Scratching can worsen the itching or also break the skin. This can put the skin at risk of infection.  The condition is most common in people with asthma, hay fever, hives, or dry or sensitive skin. The rash may be caused by extreme heat or heavy sweating. Skin irritants can cause the rash to flare up. These can include wool or silk clothing, grease, oils, some medicines, and harsh soaps and detergents. Emotional stress can also be a trigger.  Treatment is done to relieve the itching and inflammation of the skin.  Home care  Follow these tips to care for your condition:    Keep the areas of rash clean by bathing at least every other day. Use lukewarm water to bathe. Don t use hot water, which can dry out the skin.    Don t use soaps with strong detergents. Use mild soaps made for sensitive skin.    Apply a cream or ointment to damp skin right after bathing.    Avoid things that irritate your skin. Wear absorbent, soft fabrics next to the skin rather than rough or scratchy materials.    Use mild laundry soap free of scents and perfumes. Make sure to rinse all the soap out of your clothes.    Treat any skin infection as directed.    Use oral diphenhydramine to help reduce itching. This is an antihistamine you can buy at drug and grocery stores. It can make you sleepy, so use lower doses during the daytime. Or you can use loratadine. This is an antihistamine that will not make you sleepy. Do not use diphenhydramine if you have glaucoma or have trouble urinating due to an enlarged prostate.  Follow-up care  See your healthcare  provider, or as advised. If your symptoms don t get better or if they get worse in the next 7 days, make an appointment with your healthcare provider.  When to seek medical advice  Call your healthcare provider right away  if any of these occur:    Increasing area of redness or pain in the skin    Yellow crusts or wet drainage from the rash    Fever of 100.4 F (38 C) or higher, or as directed by your healthcare provider  Date Last Reviewed: 9/1/2016 2000-2017 [employer]. [address] [place] [address]. All rights reserved. This information is not intended as a substitute for professional medical care. Always follow your healthcare professional's instructions.        Scabies  Scabies is a skin infection. It is caused by a tiny parasitic insect, or mite, that is too small to see directly. It can be seen under a microscope, but it is usually recognized only by the rash and symptoms it causes. This can make it hard to diagnose since the signs and symptoms can be similar to other diseases.  The scabies mite tunnels under the skin. It creates a small burrow, where it leaves its eggs. These eggs cabrera and grow into adults. They then create new burrows over the next 1 to 2 weeks. The mites die in about 4 to 6 weeks. The rash and itching are caused by an allergic reaction to the scabies saliva or feces.  Scabies is highly contagious. It is spread by direct skin contact. It is easily spread by close personal contact, sexual contact, or by sharing bed linens or clothing used by an infected person.  It may take 4 to 6 weeks for symptoms to appear after being exposed. Everyone living in the house with you, as well as your sexual partners, should be treated at the same time. After the first treatment, you will no longer be contagious. You may return to work, school or .  Home care    Machine wash in hot water all sheets, towels, pillowcases, underwear, pajamas, and any other clothing you have  worn lately. Use the hot cycle of a dryer or use a hot iron to sterilize.    Seal anything that is hard to wash in a plastic trash bag for 4 days. This includes coats, jackets, blankets, and bedspreads. (The insects die after 3 days off the human body.)  Medicines  Scabicides  Medicines used to treat scabies are called scabicides. These are creams that kill the scabies mites. A prescription is needed. When using these medicines:    Always follow instructions provided by your healthcare provider and pharmacist. Also follow the printed instructions that come with the medicine.    Talk with your provider about precautions to take when using these medicines.    Use the cream on your body when your skin is cool and dry. Don t use it after a hot shower or bath.    Usually the cream is put on your whole body. This means from your chin all the way down to your toes. Scabies does not usually affect an adult s head. So cream is not needed there. For children, discuss this with your child s provider.    Leave the cream or lotion on for the recommended amount of time. This is usually 8 to 12 hours.    Don t leave cream or lotion on your skin longer than directed. Don t use more than recommended.    Clean clothes should be worn after the treatment.    If you wash your hands after using the cream, you will need to reapply the cream to your hands.    If you are breastfeeding, wash off your nipples before feeding. Then reapply the cream after breastfeeding.    For babies or infants, put mittens on their hands. This will stop them from licking the cream or lotion. It will also stop them from scratching themselves because of the itching.  Other medicines    An oral medicine called ivermectin may be prescribed for severe cases. It may also be used if you can t apply creams.    Itching may cause the most discomfort. If large areas of your skin are affected, over-the-counter antihistamines may be used to reduce itching. Or you may be  given a prescription antihistamine. Some of these medicines may make you sleepy. They are best used at bedtime. Antihistamines that don t make you sleepy can be used during the day. Note: Don t use medicine that has diphenhydramine if you have glaucoma, or if you are a man who has trouble urinating due to an enlarged prostate.    If you were given antibiotics due to a bacterial infection, take them until they are finished. It is important to finish the antibiotics even if the wound looks better. This is to make sure the infection has cleared.  Follow-up care  Follow up with your healthcare provider, or as advised. Call your provider if your symptoms don t improve after 1 week, or if new burrows or rashes appear.  When to seek medical advice  Call your healthcare provider right away if any of these occur:    Yellow-brown crusts or drainage from the sores    Other signs of infection, including increasing redness, swelling, pain, or pus    Fever of 100.4 F (38 C) or higher, or as directed by your provider  Date Last Reviewed: 8/1/2016 2000-2017 [employer]. [address] [place] [address]. All rights reserved. This information is not intended as a substitute for professional medical care. Always follow your healthcare professional's instructions.

## 2021-05-26 ENCOUNTER — RECORDS - HEALTHEAST (OUTPATIENT)
Dept: ADMINISTRATIVE | Facility: CLINIC | Age: 36
End: 2021-05-26